# Patient Record
Sex: FEMALE | Race: WHITE
[De-identification: names, ages, dates, MRNs, and addresses within clinical notes are randomized per-mention and may not be internally consistent; named-entity substitution may affect disease eponyms.]

---

## 2018-03-07 ENCOUNTER — HOSPITAL ENCOUNTER (OUTPATIENT)
Age: 73
End: 2018-03-07
Payer: MEDICARE

## 2018-03-07 DIAGNOSIS — F11.20: Primary | ICD-10-CM

## 2018-03-07 LAB
AMPHET UR-MCNC: NEGATIVE NG/ML
BARBITURATE URINE VISTA: NEGATIVE
BENZODIAZEPINE URINE VISTA: NEGATIVE
COCAINE URINE VISTA: NEGATIVE
DRUG CONFIRMATION TO FOLLOW?: (no result)
ECSTACY URINE VISTA: NEGATIVE
METHADONE URINE VISTA: NEGATIVE
PCP UR QL: NEGATIVE
PH UR: 6 [PH]
THC URINE VISTA: NEGATIVE

## 2018-03-07 PROCEDURE — 80307 DRUG TEST PRSMV CHEM ANLYZR: CPT

## 2018-06-05 ENCOUNTER — HOSPITAL ENCOUNTER (OUTPATIENT)
Age: 73
End: 2018-06-05
Payer: MEDICARE

## 2018-06-05 DIAGNOSIS — M16.12: Primary | ICD-10-CM

## 2018-06-05 PROCEDURE — 73502 X-RAY EXAM HIP UNI 2-3 VIEWS: CPT

## 2018-06-18 ENCOUNTER — HOSPITAL ENCOUNTER (OUTPATIENT)
Age: 73
End: 2018-06-18
Payer: MEDICARE

## 2018-06-18 DIAGNOSIS — M48.07: Primary | ICD-10-CM

## 2018-06-18 DIAGNOSIS — M41.86: ICD-10-CM

## 2018-06-18 PROCEDURE — 72148 MRI LUMBAR SPINE W/O DYE: CPT

## 2018-08-29 ENCOUNTER — HOSPITAL ENCOUNTER (OUTPATIENT)
Age: 73
End: 2018-08-29
Payer: MEDICARE

## 2018-08-29 DIAGNOSIS — M48.061: ICD-10-CM

## 2018-08-29 DIAGNOSIS — M51.36: ICD-10-CM

## 2018-08-29 DIAGNOSIS — M47.896: Primary | ICD-10-CM

## 2018-08-29 PROCEDURE — 72100 X-RAY EXAM L-S SPINE 2/3 VWS: CPT

## 2018-09-01 ENCOUNTER — HOSPITAL ENCOUNTER (EMERGENCY)
Dept: HOSPITAL 100 - ED | Age: 73
Discharge: HOME | End: 2018-09-01
Payer: MEDICARE

## 2018-09-01 VITALS
TEMPERATURE: 98 F | RESPIRATION RATE: 18 BRPM | DIASTOLIC BLOOD PRESSURE: 91 MMHG | HEART RATE: 69 BPM | SYSTOLIC BLOOD PRESSURE: 173 MMHG | BODY MASS INDEX: 27.1 KG/M2 | OXYGEN SATURATION: 99 %

## 2018-09-01 DIAGNOSIS — Z79.899: ICD-10-CM

## 2018-09-01 DIAGNOSIS — Z79.891: ICD-10-CM

## 2018-09-01 DIAGNOSIS — J44.9: ICD-10-CM

## 2018-09-01 DIAGNOSIS — Z72.0: ICD-10-CM

## 2018-09-01 DIAGNOSIS — I10: ICD-10-CM

## 2018-09-01 DIAGNOSIS — M54.41: Primary | ICD-10-CM

## 2018-09-01 DIAGNOSIS — G89.29: ICD-10-CM

## 2018-09-01 PROCEDURE — 96372 THER/PROPH/DIAG INJ SC/IM: CPT

## 2018-09-01 PROCEDURE — 99282 EMERGENCY DEPT VISIT SF MDM: CPT

## 2018-09-02 ENCOUNTER — HOSPITAL ENCOUNTER (EMERGENCY)
Age: 73
Discharge: HOME | End: 2018-09-02
Payer: MEDICARE

## 2018-09-02 VITALS
RESPIRATION RATE: 18 BRPM | SYSTOLIC BLOOD PRESSURE: 127 MMHG | OXYGEN SATURATION: 97 % | HEART RATE: 62 BPM | DIASTOLIC BLOOD PRESSURE: 89 MMHG

## 2018-09-02 VITALS
OXYGEN SATURATION: 97 % | SYSTOLIC BLOOD PRESSURE: 156 MMHG | RESPIRATION RATE: 20 BRPM | DIASTOLIC BLOOD PRESSURE: 79 MMHG | TEMPERATURE: 98.6 F | HEART RATE: 76 BPM

## 2018-09-02 VITALS — BODY MASS INDEX: 27.1 KG/M2

## 2018-09-02 DIAGNOSIS — G89.29: ICD-10-CM

## 2018-09-02 DIAGNOSIS — Z79.899: ICD-10-CM

## 2018-09-02 DIAGNOSIS — J44.9: ICD-10-CM

## 2018-09-02 DIAGNOSIS — M54.5: Primary | ICD-10-CM

## 2018-09-02 PROCEDURE — 96372 THER/PROPH/DIAG INJ SC/IM: CPT

## 2018-09-02 PROCEDURE — 99282 EMERGENCY DEPT VISIT SF MDM: CPT

## 2018-09-14 VITALS
RESPIRATION RATE: 18 BRPM | SYSTOLIC BLOOD PRESSURE: 143 MMHG | HEART RATE: 73 BPM | DIASTOLIC BLOOD PRESSURE: 62 MMHG | TEMPERATURE: 96.9 F

## 2018-09-14 VITALS
TEMPERATURE: 98.3 F | RESPIRATION RATE: 17 BRPM | OXYGEN SATURATION: 98 % | SYSTOLIC BLOOD PRESSURE: 107 MMHG | HEART RATE: 74 BPM | DIASTOLIC BLOOD PRESSURE: 67 MMHG

## 2018-09-14 LAB
ALANINE AMINOTRANSFER ALT/SGPT: 21 U/L (ref 13–56)
ALBUMIN SERPL-MCNC: 3.4 G/DL (ref 3.2–5)
ALKALINE PHOSPHATASE: 119 U/L (ref 45–117)
ANION GAP: 9 (ref 5–15)
APTT PPP: 22.8 SECONDS (ref 24.1–36.2)
AST(SGOT): 11 U/L (ref 15–37)
BILIRUB DIRECT SERPL-MCNC: 0.1 MG/DL (ref 0–0.3)
BUN SERPL-MCNC: 15 MG/DL (ref 7–18)
BUN/CREAT RATIO: 23.1 RATIO (ref 10–20)
CALCIUM SERPL-MCNC: 8.6 MG/DL (ref 8.5–10.1)
CARBON DIOXIDE: 25 MMOL/L (ref 21–32)
CHLORIDE: 108 MMOL/L (ref 98–107)
DEPRECATED RDW RBC: 46.9 FL (ref 35.1–43.9)
DIFFERENTIAL INDICATED: (no result)
ERYTHROCYTE [DISTWIDTH] IN BLOOD: 14.2 % (ref 11.6–14.6)
EST GLOM FILT RATE - AFR AMER: 116 ML/MIN (ref 60–?)
ESTIMATED CREATININE CLEARANCE: 43.3 ML/MIN
GLOBULIN: 3.3 G/DL (ref 2.2–4.2)
GLUCOSE: 113 MG/DL (ref 74–106)
HCT VFR BLD AUTO: 40.3 % (ref 37–47)
HEMOGLOBIN: 13.1 G/DL (ref 12–15)
HGB BLD-MCNC: 13.1 G/DL (ref 12–15)
IMMATURE GRANULOCYTES COUNT: 0.01 X10^3/UL (ref 0–0)
MCV RBC: 90.4 FL (ref 81–99)
MEAN CORP HGB CONC: 32.5 G/GL (ref 32–36)
MEAN PLATELET VOL.: 10.1 FL (ref 6.2–12)
PLATELET # BLD: 264 K/MM3 (ref 150–450)
PLATELET COUNT: 264 K/MM3 (ref 150–450)
POSITIVE COUNT: NO
POSITIVE DIFFERENTIAL: NO
POSITIVE MORPHOLOGY: YES
POTASSIUM: 3.5 MMOL/L (ref 3.5–5.1)
PROTHROMBIN TIME (PROTIME)PT.: 12.9 SECONDS (ref 11.7–14.9)
RBC # BLD AUTO: 4.46 M/MM3 (ref 4.2–5.4)
RBC DISTRIBUTION WIDTH CV: 14.2 % (ref 11.6–14.6)
RBC DISTRIBUTION WIDTH SD: 46.9 FL (ref 35.1–43.9)
SCAN SMEAR PER REVIEW CRITERIA: (no result)
WBC # BLD AUTO: 10.9 K/MM3 (ref 4.4–11)
WHITE BLOOD COUNT: 10.9 K/MM3 (ref 4.4–11)

## 2018-09-21 ENCOUNTER — HOSPITAL ENCOUNTER (OUTPATIENT)
Dept: HOSPITAL 100 - WC | Age: 73
LOS: 9 days | End: 2018-09-30
Payer: MEDICARE

## 2018-09-21 VITALS
DIASTOLIC BLOOD PRESSURE: 89 MMHG | TEMPERATURE: 98.2 F | HEART RATE: 81 BPM | SYSTOLIC BLOOD PRESSURE: 147 MMHG | RESPIRATION RATE: 18 BRPM

## 2018-09-21 VITALS — BODY MASS INDEX: 27.1 KG/M2

## 2018-09-21 DIAGNOSIS — F17.200: ICD-10-CM
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
## 2018-09-21 DIAGNOSIS — Z79.899: ICD-10-CM

## 2018-09-21 DIAGNOSIS — S80.811A: Primary | ICD-10-CM

## 2018-09-21 PROCEDURE — 11042 DBRDMT SUBQ TIS 1ST 20SQCM/<: CPT

## 2018-09-21 PROCEDURE — 87205 SMEAR GRAM STAIN: CPT

## 2018-09-21 PROCEDURE — G0463 HOSPITAL OUTPT CLINIC VISIT: HCPCS

## 2018-09-21 PROCEDURE — 87070 CULTURE OTHR SPECIMN AEROBIC: CPT

## 2018-09-21 PROCEDURE — 87075 CULTR BACTERIA EXCEPT BLOOD: CPT

## 2018-09-21 PROCEDURE — 99203 OFFICE O/P NEW LOW 30 MIN: CPT

## 2018-09-26 ENCOUNTER — HOSPITAL ENCOUNTER (INPATIENT)
Dept: HOSPITAL 100 - ACINP | Age: 73
LOS: 1 days | Discharge: HOME HEALTH | DRG: 470 | End: 2018-09-27
Payer: MEDICARE

## 2018-09-26 VITALS
DIASTOLIC BLOOD PRESSURE: 77 MMHG | SYSTOLIC BLOOD PRESSURE: 156 MMHG | RESPIRATION RATE: 16 BRPM | HEART RATE: 70 BPM | OXYGEN SATURATION: 93 %

## 2018-09-26 VITALS
OXYGEN SATURATION: 98 % | RESPIRATION RATE: 16 BRPM | TEMPERATURE: 97.7 F | DIASTOLIC BLOOD PRESSURE: 72 MMHG | SYSTOLIC BLOOD PRESSURE: 159 MMHG | HEART RATE: 67 BPM

## 2018-09-26 VITALS — SYSTOLIC BLOOD PRESSURE: 137 MMHG | DIASTOLIC BLOOD PRESSURE: 79 MMHG

## 2018-09-26 VITALS — DIASTOLIC BLOOD PRESSURE: 91 MMHG | SYSTOLIC BLOOD PRESSURE: 201 MMHG

## 2018-09-26 VITALS
SYSTOLIC BLOOD PRESSURE: 137 MMHG | OXYGEN SATURATION: 96 % | HEART RATE: 69 BPM | RESPIRATION RATE: 16 BRPM | DIASTOLIC BLOOD PRESSURE: 75 MMHG

## 2018-09-26 VITALS
OXYGEN SATURATION: 96 % | RESPIRATION RATE: 16 BRPM | TEMPERATURE: 97.5 F | SYSTOLIC BLOOD PRESSURE: 137 MMHG | DIASTOLIC BLOOD PRESSURE: 75 MMHG | HEART RATE: 82 BPM

## 2018-09-26 VITALS
OXYGEN SATURATION: 94 % | RESPIRATION RATE: 14 BRPM | HEART RATE: 70 BPM | SYSTOLIC BLOOD PRESSURE: 156 MMHG | TEMPERATURE: 97.9 F | DIASTOLIC BLOOD PRESSURE: 82 MMHG

## 2018-09-26 VITALS
HEART RATE: 66 BPM | SYSTOLIC BLOOD PRESSURE: 150 MMHG | RESPIRATION RATE: 18 BRPM | OXYGEN SATURATION: 94 % | TEMPERATURE: 97.7 F | DIASTOLIC BLOOD PRESSURE: 69 MMHG

## 2018-09-26 VITALS
OXYGEN SATURATION: 95 % | DIASTOLIC BLOOD PRESSURE: 56 MMHG | HEART RATE: 72 BPM | SYSTOLIC BLOOD PRESSURE: 137 MMHG | TEMPERATURE: 98.42 F | RESPIRATION RATE: 18 BRPM

## 2018-09-26 VITALS
RESPIRATION RATE: 16 BRPM | SYSTOLIC BLOOD PRESSURE: 137 MMHG | DIASTOLIC BLOOD PRESSURE: 87 MMHG | HEART RATE: 77 BPM | OXYGEN SATURATION: 99 %

## 2018-09-26 VITALS
SYSTOLIC BLOOD PRESSURE: 198 MMHG | RESPIRATION RATE: 16 BRPM | HEART RATE: 81 BPM | OXYGEN SATURATION: 99 % | DIASTOLIC BLOOD PRESSURE: 95 MMHG

## 2018-09-26 VITALS
OXYGEN SATURATION: 94 % | RESPIRATION RATE: 14 BRPM | SYSTOLIC BLOOD PRESSURE: 162 MMHG | HEART RATE: 70 BPM | DIASTOLIC BLOOD PRESSURE: 75 MMHG

## 2018-09-26 VITALS
OXYGEN SATURATION: 96 % | SYSTOLIC BLOOD PRESSURE: 189 MMHG | RESPIRATION RATE: 16 BRPM | DIASTOLIC BLOOD PRESSURE: 75 MMHG | HEART RATE: 68 BPM

## 2018-09-26 VITALS — RESPIRATION RATE: 18 BRPM | HEART RATE: 70 BPM

## 2018-09-26 VITALS
OXYGEN SATURATION: 98 % | TEMPERATURE: 97.52 F | RESPIRATION RATE: 18 BRPM | DIASTOLIC BLOOD PRESSURE: 46 MMHG | HEART RATE: 70 BPM | SYSTOLIC BLOOD PRESSURE: 127 MMHG

## 2018-09-26 VITALS
OXYGEN SATURATION: 98 % | SYSTOLIC BLOOD PRESSURE: 137 MMHG | RESPIRATION RATE: 16 BRPM | TEMPERATURE: 97.1 F | HEART RATE: 67 BPM | DIASTOLIC BLOOD PRESSURE: 75 MMHG

## 2018-09-26 VITALS — DIASTOLIC BLOOD PRESSURE: 85 MMHG | SYSTOLIC BLOOD PRESSURE: 137 MMHG

## 2018-09-26 VITALS — SYSTOLIC BLOOD PRESSURE: 177 MMHG | DIASTOLIC BLOOD PRESSURE: 75 MMHG

## 2018-09-26 VITALS — SYSTOLIC BLOOD PRESSURE: 137 MMHG | DIASTOLIC BLOOD PRESSURE: 99 MMHG

## 2018-09-26 VITALS — BODY MASS INDEX: 27.3 KG/M2

## 2018-09-26 DIAGNOSIS — I10: ICD-10-CM

## 2018-09-26 DIAGNOSIS — M54.5: ICD-10-CM

## 2018-09-26 DIAGNOSIS — M32.9: ICD-10-CM

## 2018-09-26 DIAGNOSIS — G89.29: ICD-10-CM

## 2018-09-26 DIAGNOSIS — J44.9: ICD-10-CM

## 2018-09-26 DIAGNOSIS — Z87.442: ICD-10-CM

## 2018-09-26 DIAGNOSIS — M16.12: Primary | ICD-10-CM

## 2018-09-26 DIAGNOSIS — F17.210: ICD-10-CM

## 2018-09-26 DIAGNOSIS — Z96.641: ICD-10-CM

## 2018-09-26 PROCEDURE — 85730 THROMBOPLASTIN TIME PARTIAL: CPT

## 2018-09-26 PROCEDURE — 97162 PT EVAL MOD COMPLEX 30 MIN: CPT

## 2018-09-26 PROCEDURE — 80076 HEPATIC FUNCTION PANEL: CPT

## 2018-09-26 PROCEDURE — 97166 OT EVAL MOD COMPLEX 45 MIN: CPT

## 2018-09-26 PROCEDURE — 97110 THERAPEUTIC EXERCISES: CPT

## 2018-09-26 PROCEDURE — 93005 ELECTROCARDIOGRAM TRACING: CPT

## 2018-09-26 PROCEDURE — 73501 X-RAY EXAM HIP UNI 1 VIEW: CPT

## 2018-09-26 PROCEDURE — 87081 CULTURE SCREEN ONLY: CPT

## 2018-09-26 PROCEDURE — 85027 COMPLETE CBC AUTOMATED: CPT

## 2018-09-26 PROCEDURE — 85610 PROTHROMBIN TIME: CPT

## 2018-09-26 PROCEDURE — 99251: CPT

## 2018-09-26 PROCEDURE — 97530 THERAPEUTIC ACTIVITIES: CPT

## 2018-09-26 PROCEDURE — C1776 JOINT DEVICE (IMPLANTABLE): HCPCS

## 2018-09-26 PROCEDURE — 73502 X-RAY EXAM HIP UNI 2-3 VIEWS: CPT

## 2018-09-26 PROCEDURE — 80048 BASIC METABOLIC PNL TOTAL CA: CPT

## 2018-09-26 PROCEDURE — 94640 AIRWAY INHALATION TREATMENT: CPT

## 2018-09-26 PROCEDURE — 36415 COLL VENOUS BLD VENIPUNCTURE: CPT

## 2018-09-26 PROCEDURE — 76000 FLUOROSCOPY <1 HR PHYS/QHP: CPT

## 2018-09-26 PROCEDURE — 85025 COMPLETE CBC W/AUTO DIFF WBC: CPT

## 2018-09-26 PROCEDURE — G0463 HOSPITAL OUTPT CLINIC VISIT: HCPCS

## 2018-09-26 PROCEDURE — 0SRB04A REPLACEMENT OF LEFT HIP JOINT WITH CERAMIC ON POLYETHYLENE SYNTHETIC SUBSTITUTE, UNCEMENTED, OPEN APPROACH: ICD-10-PCS | Performed by: ORTHOPAEDIC SURGERY

## 2018-09-26 PROCEDURE — 99406 BEHAV CHNG SMOKING 3-10 MIN: CPT

## 2018-09-26 RX ADMIN — DOCUSATE SODIUM 50 MG AND SENNOSIDES 8.6 MG 2 TABLET: 8.6; 5 TABLET, FILM COATED ORAL at 22:58

## 2018-09-26 RX ADMIN — PROMETHAZINE HYDROCHLORIDE 12.5 MG: 25 INJECTION INTRAMUSCULAR; INTRAVENOUS at 17:28

## 2018-09-26 RX ADMIN — OXYCODONE HYDROCHLORIDE 10 MG: 10 TABLET, FILM COATED, EXTENDED RELEASE ORAL at 07:17

## 2018-09-27 VITALS — HEART RATE: 75 BPM | OXYGEN SATURATION: 98 % | RESPIRATION RATE: 16 BRPM

## 2018-09-27 VITALS
DIASTOLIC BLOOD PRESSURE: 49 MMHG | OXYGEN SATURATION: 98 % | RESPIRATION RATE: 16 BRPM | SYSTOLIC BLOOD PRESSURE: 130 MMHG | TEMPERATURE: 98.06 F | HEART RATE: 72 BPM

## 2018-09-27 VITALS
OXYGEN SATURATION: 94 % | TEMPERATURE: 97.8 F | DIASTOLIC BLOOD PRESSURE: 51 MMHG | RESPIRATION RATE: 18 BRPM | HEART RATE: 71 BPM | SYSTOLIC BLOOD PRESSURE: 115 MMHG

## 2018-09-27 VITALS
HEART RATE: 65 BPM | SYSTOLIC BLOOD PRESSURE: 126 MMHG | DIASTOLIC BLOOD PRESSURE: 57 MMHG | RESPIRATION RATE: 18 BRPM | OXYGEN SATURATION: 94 % | TEMPERATURE: 97.52 F

## 2018-09-27 LAB
ANION GAP: 7 (ref 5–15)
BUN SERPL-MCNC: 13 MG/DL (ref 7–18)
BUN/CREAT RATIO: 19.5 RATIO (ref 10–20)
CALCIUM SERPL-MCNC: 8.4 MG/DL (ref 8.5–10.1)
CARBON DIOXIDE: 26 MMOL/L (ref 21–32)
CHLORIDE: 106 MMOL/L (ref 98–107)
DEPRECATED RDW RBC: 47 FL (ref 35.1–43.9)
ERYTHROCYTE [DISTWIDTH] IN BLOOD: 14.5 % (ref 11.6–14.6)
EST GLOM FILT RATE - AFR AMER: 112 ML/MIN (ref 60–?)
ESTIMATED CREATININE CLEARANCE: 43.3 ML/MIN
GLUCOSE: 123 MG/DL (ref 74–106)
HCT VFR BLD AUTO: 35 % (ref 37–47)
HEMOGLOBIN: 11.6 G/DL (ref 12–15)
HGB BLD-MCNC: 11.6 G/DL (ref 12–15)
MCV RBC: 91.1 FL (ref 81–99)
MEAN CORP HGB CONC: 33.1 G/GL (ref 32–36)
MEAN PLATELET VOL.: 10 FL (ref 6.2–12)
PLATELET # BLD: 188 K/MM3 (ref 150–450)
PLATELET COUNT: 188 K/MM3 (ref 150–450)
POTASSIUM: 4.1 MMOL/L (ref 3.5–5.1)
RBC # BLD AUTO: 3.84 M/MM3 (ref 4.2–5.4)
RBC DISTRIBUTION WIDTH CV: 14.5 % (ref 11.6–14.6)
RBC DISTRIBUTION WIDTH SD: 47 FL (ref 35.1–43.9)
SCAN INDICATED ON CBC? Y/N: NO
WBC # BLD AUTO: 12.5 K/MM3 (ref 4.4–11)
WHITE BLOOD COUNT: 12.5 K/MM3 (ref 4.4–11)

## 2018-09-27 RX ADMIN — DOCUSATE SODIUM 50 MG AND SENNOSIDES 8.6 MG 2 TABLET: 8.6; 5 TABLET, FILM COATED ORAL at 07:44

## 2018-09-27 RX ADMIN — BUDESONIDE 0.5 MG: 0.5 SUSPENSION RESPIRATORY (INHALATION) at 08:00

## 2019-09-05 ENCOUNTER — HOSPITAL ENCOUNTER (OUTPATIENT)
Age: 74
End: 2019-09-05
Payer: MEDICARE

## 2019-09-05 VITALS — BODY MASS INDEX: 27.3 KG/M2

## 2019-09-05 DIAGNOSIS — M79.606: ICD-10-CM

## 2019-09-05 DIAGNOSIS — M54.9: Primary | ICD-10-CM

## 2019-09-05 PROCEDURE — 72100 X-RAY EXAM L-S SPINE 2/3 VWS: CPT

## 2019-12-04 ENCOUNTER — HOSPITAL ENCOUNTER (OUTPATIENT)
Age: 74
End: 2019-12-04
Payer: MEDICARE

## 2019-12-04 VITALS — BODY MASS INDEX: 27.3 KG/M2

## 2019-12-04 DIAGNOSIS — M54.5: Primary | ICD-10-CM

## 2019-12-04 DIAGNOSIS — M79.606: ICD-10-CM

## 2019-12-04 PROCEDURE — 72110 X-RAY EXAM L-2 SPINE 4/>VWS: CPT

## 2020-06-17 ENCOUNTER — HOSPITAL ENCOUNTER (OUTPATIENT)
Dept: HOSPITAL 100 - MRI | Age: 75
End: 2020-06-17
Payer: MEDICARE

## 2020-06-17 DIAGNOSIS — M79.606: ICD-10-CM

## 2020-06-17 DIAGNOSIS — M54.9: Primary | ICD-10-CM

## 2020-06-17 PROCEDURE — 72148 MRI LUMBAR SPINE W/O DYE: CPT

## 2020-10-21 ENCOUNTER — HOSPITAL ENCOUNTER (OUTPATIENT)
Age: 75
End: 2020-10-21
Payer: MEDICARE

## 2020-10-21 VITALS — BODY MASS INDEX: 26.4 KG/M2

## 2020-10-21 DIAGNOSIS — M35.00: ICD-10-CM

## 2020-10-21 DIAGNOSIS — I10: ICD-10-CM

## 2020-10-21 DIAGNOSIS — J44.9: ICD-10-CM

## 2020-10-21 DIAGNOSIS — K21.9: ICD-10-CM

## 2020-10-21 DIAGNOSIS — M79.7: ICD-10-CM

## 2020-10-21 DIAGNOSIS — M06.4: Primary | ICD-10-CM

## 2020-10-21 DIAGNOSIS — R76.8: ICD-10-CM

## 2020-10-21 LAB
ALANINE AMINOTRANSFER ALT/SGPT: 21 U/L (ref 13–56)
ALBUMIN SERPL-MCNC: 3.8 G/DL (ref 3.2–5)
ALKALINE PHOSPHATASE: 119 U/L (ref 45–117)
ANION GAP: 6 (ref 5–15)
APTT PPP: 23.7 SECONDS (ref 24.1–36.2)
AST(SGOT): 11 U/L (ref 15–37)
BUN SERPL-MCNC: 12 MG/DL (ref 7–18)
BUN/CREAT RATIO: 22.6 RATIO (ref 10–20)
CALCIUM SERPL-MCNC: 9 MG/DL (ref 8.5–10.1)
CARBON DIOXIDE: 28 MMOL/L (ref 21–32)
CHLORIDE: 106 MMOL/L (ref 98–107)
CREAT UR-MCNC: 119 MG/DL
CRP SERPL-MCNC: < 2.9 MG/L (ref 0–3)
DEPRECATED RDW RBC: 49.6 FL (ref 35.1–43.9)
DIFFERENTIAL INDICATED: (no result)
ERYTHROCYTE [DISTWIDTH] IN BLOOD: 14.7 % (ref 11.6–14.6)
EST GLOM FILT RATE - AFR AMER: 145 ML/MIN (ref 60–?)
EXAGEN: (no result)
GLOBULIN: 3.4 G/DL (ref 2.2–4.2)
GLUCOSE: 87 MG/DL (ref 74–106)
HCT VFR BLD AUTO: 40 % (ref 37–47)
HEMOGLOBIN: 12.4 G/DL (ref 12–15)
HGB BLD-MCNC: 12.4 G/DL (ref 12–15)
IMMATURE GRANULOCYTES COUNT: 0.02 X10^3/UL (ref 0–0)
LEUKOCYTE ESTERASE UR QL STRIP: 25 /UL
MCV RBC: 90.9 FL (ref 81–99)
MEAN CORP HGB CONC: 31 G/DL (ref 32–36)
MEAN PLATELET VOL.: 10.6 FL (ref 6.2–12)
NRBC FLAGGED BY ANALYZER: 0 % (ref 0–5)
PLATELET # BLD: 234 K/MM3 (ref 150–450)
PLATELET COUNT: 234 K/MM3 (ref 150–450)
POSITIVE MORPHOLOGY: YES
POTASSIUM: 3.9 MMOL/L (ref 3.5–5.1)
PROT UR QL STRIP.AUTO: 30 MG/DL
PROT UR-MCNC: 18.7 MG/DL (ref ?–11.9)
PROT/CREAT UR: 157 MG/G CRE (ref 0–200)
PROTHROMBIN TIME (PROTIME)PT.: 12.5 SECONDS (ref 11.7–14.9)
RBC # BLD AUTO: 4.4 M/MM3 (ref 4.2–5.4)
RBC DISTRIBUTION WIDTH CV: 14.7 % (ref 11.6–14.6)
RBC DISTRIBUTION WIDTH SD: 49.6 FL (ref 35.1–43.9)
RBC UR QL: 25 /UL
REACTIVE LYMPHOCYTE: (no result)
SCAN SMEAR PER REVIEW CRITERIA: (no result)
SP GR UR: 1.01 (ref 1–1.03)
URINE PRESERVATIVE: (no result)
WBC # BLD AUTO: 9.4 K/MM3 (ref 4.4–11)
WHITE BLOOD COUNT: 9.4 K/MM3 (ref 4.4–11)

## 2020-10-21 PROCEDURE — 81002 URINALYSIS NONAUTO W/O SCOPE: CPT

## 2020-10-21 PROCEDURE — 82570 ASSAY OF URINE CREATININE: CPT

## 2020-10-21 PROCEDURE — 85025 COMPLETE CBC W/AUTO DIFF WBC: CPT

## 2020-10-21 PROCEDURE — 85598 HEXAGNAL PHOSPH PLTLT NEUTRL: CPT

## 2020-10-21 PROCEDURE — 36415 COLL VENOUS BLD VENIPUNCTURE: CPT

## 2020-10-21 PROCEDURE — 85652 RBC SED RATE AUTOMATED: CPT

## 2020-10-21 PROCEDURE — 85730 THROMBOPLASTIN TIME PARTIAL: CPT

## 2020-10-21 PROCEDURE — 85610 PROTHROMBIN TIME: CPT

## 2020-10-21 PROCEDURE — 86706 HEP B SURFACE ANTIBODY: CPT

## 2020-10-21 PROCEDURE — 84156 ASSAY OF PROTEIN URINE: CPT

## 2020-10-21 PROCEDURE — 86803 HEPATITIS C AB TEST: CPT

## 2020-10-21 PROCEDURE — 86140 C-REACTIVE PROTEIN: CPT

## 2020-10-21 PROCEDURE — 85670 THROMBIN TIME PLASMA: CPT

## 2020-10-21 PROCEDURE — 87340 HEPATITIS B SURFACE AG IA: CPT

## 2020-10-21 PROCEDURE — 80053 COMPREHEN METABOLIC PANEL: CPT

## 2020-10-29 LAB
DILUTE RUSSELL VIPER VENOM: 35 SEC (ref 0–47)
PTT-LA: 33 SEC (ref 0–51.9)
SCREEN DRVVT: 35 SEC (ref 0–47)

## 2021-01-25 ENCOUNTER — HOSPITAL ENCOUNTER (OUTPATIENT)
Age: 76
End: 2021-01-25
Payer: MEDICARE

## 2021-01-25 VITALS — BODY MASS INDEX: 26.4 KG/M2

## 2021-01-25 DIAGNOSIS — R10.84: Primary | ICD-10-CM

## 2021-01-25 LAB
AMYLASE SERPL-CCNC: 62 U/L (ref 25–115)
LIPASE: 117 U/L (ref 73–393)

## 2021-01-25 PROCEDURE — 83690 ASSAY OF LIPASE: CPT

## 2021-01-25 PROCEDURE — 82150 ASSAY OF AMYLASE: CPT

## 2022-11-09 ENCOUNTER — HOSPITAL ENCOUNTER (OUTPATIENT)
Age: 77
Discharge: HOME | End: 2022-11-09
Payer: MEDICARE

## 2022-11-09 DIAGNOSIS — R19.7: ICD-10-CM

## 2022-11-09 DIAGNOSIS — R10.13: Primary | ICD-10-CM

## 2022-11-09 LAB
ALANINE AMINOTRANSFER ALT/SGPT: 19 U/L (ref 13–56)
ALBUMIN SERPL-MCNC: 3.5 G/DL (ref 3.2–5)
ALKALINE PHOSPHATASE: 91 U/L (ref 45–117)
AMYLASE SERPL-CCNC: 61 U/L (ref 25–115)
ANION GAP: 6 (ref 5–15)
AST(SGOT): 14 U/L (ref 15–37)
BUN SERPL-MCNC: 15 MG/DL (ref 7–18)
BUN/CREAT RATIO: 24.9 RATIO (ref 10–20)
CALCIUM SERPL-MCNC: 9.3 MG/DL (ref 8.5–10.1)
CARBON DIOXIDE: 26 MMOL/L (ref 21–32)
CHLORIDE: 106 MMOL/L (ref 98–107)
CRP SERPL-MCNC: 3.18 MG/L (ref 0–3)
DEPRECATED RDW RBC: 46.3 FL (ref 35.1–43.9)
DIFFERENTIAL INDICATED: (no result)
ERYTHROCYTE [DISTWIDTH] IN BLOOD: 14.4 % (ref 11.6–14.6)
EST GLOM FILT RATE - AFR AMER: 125 ML/MIN (ref 60–?)
GLOBULIN: 3.4 G/DL (ref 2.2–4.2)
GLUCOSE: 86 MG/DL (ref 74–106)
HCT VFR BLD AUTO: 40.2 % (ref 37–47)
HEMOGLOBIN: 13.3 G/DL (ref 12–15)
HGB BLD-MCNC: 13.3 G/DL (ref 12–15)
IMMATURE GRANULOCYTES COUNT: 0.02 X10^3/UL (ref 0–0)
LIPASE: 98 U/L (ref 73–393)
MCV RBC: 88.4 FL (ref 81–99)
MEAN CORP HGB CONC: 33.1 G/DL (ref 32–36)
MEAN PLATELET VOL.: 10.4 FL (ref 6.2–12)
NRBC FLAGGED BY ANALYZER: 0 % (ref 0–5)
PLATELET # BLD: 179 K/MM3 (ref 150–450)
PLATELET COUNT: 179 K/MM3 (ref 150–450)
POSITIVE MORPHOLOGY: YES
POTASSIUM: 4.2 MMOL/L (ref 3.5–5.1)
RBC # BLD AUTO: 4.55 M/MM3 (ref 4.2–5.4)
RBC DISTRIBUTION WIDTH CV: 14.4 % (ref 11.6–14.6)
RBC DISTRIBUTION WIDTH SD: 46.3 FL (ref 35.1–43.9)
REACTIVE LYMPHOCYTE: (no result)
SCAN SMEAR PER REVIEW CRITERIA: (no result)
WBC # BLD AUTO: 7.9 K/MM3 (ref 4.4–11)
WHITE BLOOD COUNT: 7.9 K/MM3 (ref 4.4–11)

## 2022-11-09 PROCEDURE — 82785 ASSAY OF IGE: CPT

## 2022-11-09 PROCEDURE — 83690 ASSAY OF LIPASE: CPT

## 2022-11-09 PROCEDURE — 85025 COMPLETE CBC W/AUTO DIFF WBC: CPT

## 2022-11-09 PROCEDURE — 82787 IGG 1 2 3 OR 4 EACH: CPT

## 2022-11-09 PROCEDURE — 82150 ASSAY OF AMYLASE: CPT

## 2022-11-09 PROCEDURE — 83516 IMMUNOASSAY NONANTIBODY: CPT

## 2022-11-09 PROCEDURE — 86140 C-REACTIVE PROTEIN: CPT

## 2022-11-09 PROCEDURE — 86256 FLUORESCENT ANTIBODY TITER: CPT

## 2022-11-09 PROCEDURE — 86255 FLUORESCENT ANTIBODY SCREEN: CPT

## 2022-11-09 PROCEDURE — 86235 NUCLEAR ANTIGEN ANTIBODY: CPT

## 2022-11-09 PROCEDURE — 80053 COMPREHEN METABOLIC PANEL: CPT

## 2022-11-09 PROCEDURE — 36415 COLL VENOUS BLD VENIPUNCTURE: CPT

## 2022-11-09 PROCEDURE — 86225 DNA ANTIBODY NATIVE: CPT

## 2022-11-09 PROCEDURE — 82784 ASSAY IGA/IGD/IGG/IGM EACH: CPT

## 2022-11-09 PROCEDURE — 85652 RBC SED RATE AUTOMATED: CPT

## 2022-11-10 LAB
ANTI-CHROMATIN: <0.2 AI (ref 0–0.9)
ANTI-JO: <0.2 AI (ref 0–0.9)
ENA JO1 AB SER-ACNC: <0.2 AI (ref 0–0.9)
SJOGREN'S ANTI-SS-A TEST: 1.6 AI (ref 0–0.9)
SJOGREN'S ANTI-SS-B TEST: < 0.2 AI (ref 0–0.9)

## 2022-11-11 LAB
ANTI-DSDNA  AB: <1 IU/ML (ref 0–9)
DSDNA AB SER-ACNC: <1 IU/ML (ref 0–9)
IGA SER-ACNC: 197 MG/DL (ref 64–422)
IMMUNOGLOBULIN A: 197 MG/DL (ref 64–422)

## 2022-11-14 ENCOUNTER — HOSPITAL ENCOUNTER (OUTPATIENT)
Dept: HOSPITAL 100 - LAB | Age: 77
Discharge: HOME | End: 2022-11-14
Payer: MEDICARE

## 2022-11-14 DIAGNOSIS — R19.7: ICD-10-CM

## 2022-11-14 DIAGNOSIS — R10.13: Primary | ICD-10-CM

## 2022-11-14 PROCEDURE — 82705 FATS/LIPIDS FECES QUAL: CPT

## 2022-11-14 PROCEDURE — 82653 EL-1 FECAL QUANTITATIVE: CPT

## 2022-11-17 LAB
ACTIN IGG SERPL-ACNC: 8 UNITS (ref 0–19)
ANTI-SMOOTH MUSCLE ABS: 8 UNITS (ref 0–19)
C-ANCA SER-ACNC: (no result) TITER
IGA SERPL-MCNC: 192 MG/DL (ref 64–422)
IGG SERPL-MCNC: 834 MG/DL (ref 586–1602)
IGG1 SER-MCNC: 628 MG/DL (ref 248–810)
IGG2 SER-MCNC: 95 MG/DL (ref 130–555)
IGG3 SER-MCNC: 36 MG/DL (ref 15–102)
IGG4 SER-MCNC: 24 MG/DL (ref 2–96)
IGM SERPL-MCNC: 77 MG/DL (ref 26–217)
IMMUNOGLOBULIN A: 192 MG/DL (ref 64–422)
IMMUNOGLOBULIN M: 77 MG/DL (ref 26–217)
P-ANCA TITR SER IF: (no result) TITER

## 2023-01-11 ENCOUNTER — HOSPITAL ENCOUNTER (OUTPATIENT)
Age: 78
Discharge: HOME | End: 2023-01-11
Payer: MEDICARE

## 2023-01-11 DIAGNOSIS — R19.7: ICD-10-CM

## 2023-01-11 DIAGNOSIS — R10.13: Primary | ICD-10-CM

## 2023-01-11 LAB — LDH: 181 U/L (ref 84–246)

## 2023-01-11 PROCEDURE — 86334 IMMUNOFIX E-PHORESIS SERUM: CPT

## 2023-01-11 PROCEDURE — 84165 PROTEIN E-PHORESIS SERUM: CPT

## 2023-01-11 PROCEDURE — 82784 ASSAY IGA/IGD/IGG/IGM EACH: CPT

## 2023-01-11 PROCEDURE — 36415 COLL VENOUS BLD VENIPUNCTURE: CPT

## 2023-01-11 PROCEDURE — 83615 LACTATE (LD) (LDH) ENZYME: CPT

## 2023-01-11 PROCEDURE — 82941 ASSAY OF GASTRIN: CPT

## 2023-01-13 LAB
ALBUMIN SERPL-SCNC: 3.8 G/DL (ref 2.9–4.4)
ALBUMIN: 3.8 G/DL (ref 2.9–4.4)
GAMMA GLOB SERPL ELPH-MCNC: 0.8 G/DL (ref 0.4–1.8)
GAMMA GLOBULIN: 0.8 G/DL (ref 0.4–1.8)
GASTRIN SERPL-MCNC: 71 PG/ML (ref 0–115)
IGA SERPL-MCNC: 158 MG/DL (ref 64–422)
IGG SERPL-MCNC: 857 MG/DL (ref 586–1602)
IGM SERPL-MCNC: 70 MG/DL (ref 26–217)
IMMUNOGLOBULIN A: 158 MG/DL (ref 64–422)
IMMUNOGLOBULIN G: 857 MG/DL (ref 586–1602)
IMMUNOGLOBULIN M: 70 MG/DL (ref 26–217)
PROEL- TOTAL PROTEIN: 6.4 G/DL (ref 6–8.5)
PROT SERPL-MCNC: 6.4 G/DL (ref 6–8.5)

## 2023-01-18 ENCOUNTER — HOSPITAL ENCOUNTER (OUTPATIENT)
Dept: HOSPITAL 100 - LABSPEC | Age: 78
Discharge: HOME | End: 2023-01-18
Payer: MEDICARE

## 2023-01-18 DIAGNOSIS — K58.9: Primary | ICD-10-CM

## 2023-01-18 DIAGNOSIS — R10.13: ICD-10-CM

## 2023-01-18 DIAGNOSIS — R19.7: ICD-10-CM

## 2023-01-18 PROCEDURE — 83993 ASSAY FOR CALPROTECTIN FECAL: CPT

## 2023-01-18 PROCEDURE — 87506 IADNA-DNA/RNA PROBE TQ 6-11: CPT

## 2023-01-18 PROCEDURE — 83630 LACTOFERRIN FECAL (QUAL): CPT

## 2023-01-20 LAB — CALPROTECTIN, STOOL: 114 UG/G (ref 0–120)

## 2023-02-21 ENCOUNTER — HOSPITAL ENCOUNTER (OUTPATIENT)
Dept: HOSPITAL 100 - MRI | Age: 78
Discharge: HOME | End: 2023-02-21
Payer: MEDICARE

## 2023-02-21 DIAGNOSIS — K85.90: Primary | ICD-10-CM

## 2023-02-21 PROCEDURE — 74181 MRI ABDOMEN W/O CONTRAST: CPT

## 2023-03-07 ENCOUNTER — HOSPITAL ENCOUNTER (OUTPATIENT)
Dept: HOSPITAL 100 - LAB | Age: 78
Discharge: HOME | End: 2023-03-07
Payer: MEDICARE

## 2023-03-07 DIAGNOSIS — D13.4: Primary | ICD-10-CM

## 2023-03-07 PROCEDURE — 36415 COLL VENOUS BLD VENIPUNCTURE: CPT

## 2023-03-07 PROCEDURE — 86301 IMMUNOASSAY TUMOR CA 19-9: CPT

## 2023-03-09 LAB — CARBOHYDRATE AG 19-9  2261: 4 U/ML (ref 0–35)

## 2023-03-15 ENCOUNTER — HOSPITAL ENCOUNTER (OUTPATIENT)
Dept: HOSPITAL 100 - EN | Age: 78
Discharge: HOME | End: 2023-03-15
Payer: MEDICARE

## 2023-03-15 VITALS
SYSTOLIC BLOOD PRESSURE: 143 MMHG | DIASTOLIC BLOOD PRESSURE: 54 MMHG | HEART RATE: 65 BPM | RESPIRATION RATE: 16 BRPM | OXYGEN SATURATION: 100 %

## 2023-03-15 VITALS
RESPIRATION RATE: 16 BRPM | OXYGEN SATURATION: 100 % | DIASTOLIC BLOOD PRESSURE: 54 MMHG | HEART RATE: 64 BPM | SYSTOLIC BLOOD PRESSURE: 147 MMHG

## 2023-03-15 VITALS
SYSTOLIC BLOOD PRESSURE: 141 MMHG | OXYGEN SATURATION: 100 % | DIASTOLIC BLOOD PRESSURE: 54 MMHG | TEMPERATURE: 98.7 F | HEART RATE: 66 BPM | RESPIRATION RATE: 16 BRPM

## 2023-03-15 VITALS
OXYGEN SATURATION: 98 % | SYSTOLIC BLOOD PRESSURE: 147 MMHG | TEMPERATURE: 98.2 F | DIASTOLIC BLOOD PRESSURE: 54 MMHG | HEART RATE: 70 BPM | RESPIRATION RATE: 18 BRPM

## 2023-03-15 VITALS
DIASTOLIC BLOOD PRESSURE: 56 MMHG | OXYGEN SATURATION: 98 % | HEART RATE: 64 BPM | SYSTOLIC BLOOD PRESSURE: 141 MMHG | RESPIRATION RATE: 16 BRPM

## 2023-03-15 VITALS
SYSTOLIC BLOOD PRESSURE: 137 MMHG | RESPIRATION RATE: 14 BRPM | TEMPERATURE: 97.2 F | DIASTOLIC BLOOD PRESSURE: 64 MMHG | HEART RATE: 72 BPM | OXYGEN SATURATION: 98 %

## 2023-03-15 VITALS — BODY MASS INDEX: 26.4 KG/M2

## 2023-03-15 VITALS — DIASTOLIC BLOOD PRESSURE: 54 MMHG | SYSTOLIC BLOOD PRESSURE: 147 MMHG

## 2023-03-15 DIAGNOSIS — K25.7: ICD-10-CM

## 2023-03-15 DIAGNOSIS — Z79.899: ICD-10-CM

## 2023-03-15 DIAGNOSIS — K29.70: ICD-10-CM

## 2023-03-15 DIAGNOSIS — G47.30: ICD-10-CM

## 2023-03-15 DIAGNOSIS — D12.5: ICD-10-CM

## 2023-03-15 DIAGNOSIS — K58.0: ICD-10-CM

## 2023-03-15 DIAGNOSIS — K21.00: ICD-10-CM

## 2023-03-15 DIAGNOSIS — K57.30: ICD-10-CM

## 2023-03-15 DIAGNOSIS — K63.89: ICD-10-CM

## 2023-03-15 DIAGNOSIS — F17.200: ICD-10-CM

## 2023-03-15 DIAGNOSIS — I10: ICD-10-CM

## 2023-03-15 DIAGNOSIS — Z79.82: ICD-10-CM

## 2023-03-15 DIAGNOSIS — D12.3: Primary | ICD-10-CM

## 2023-03-15 PROCEDURE — 45380 COLONOSCOPY AND BIOPSY: CPT

## 2023-03-15 PROCEDURE — 88305 TISSUE EXAM BY PATHOLOGIST: CPT

## 2023-03-15 PROCEDURE — 88342 IMHCHEM/IMCYTCHM 1ST ANTB: CPT

## 2023-03-15 PROCEDURE — 45385 COLONOSCOPY W/LESION REMOVAL: CPT

## 2023-03-15 PROCEDURE — 0DJD8ZZ INSPECTION OF LOWER INTESTINAL TRACT, VIA NATURAL OR ARTIFICIAL OPENING ENDOSCOPIC: ICD-10-PCS | Performed by: INTERNAL MEDICINE

## 2023-03-15 PROCEDURE — 43239 EGD BIOPSY SINGLE/MULTIPLE: CPT

## 2023-03-15 PROCEDURE — 88313 SPECIAL STAINS GROUP 2: CPT

## 2023-06-07 ENCOUNTER — HOSPITAL ENCOUNTER (EMERGENCY)
Dept: HOSPITAL 100 - ED | Age: 78
Discharge: LEFT BEFORE DISCHARGE | End: 2023-06-07
Payer: MEDICARE

## 2023-06-07 VITALS
RESPIRATION RATE: 15 BRPM | DIASTOLIC BLOOD PRESSURE: 75 MMHG | SYSTOLIC BLOOD PRESSURE: 206 MMHG | OXYGEN SATURATION: 98 % | HEART RATE: 81 BPM | TEMPERATURE: 97.52 F

## 2023-06-07 VITALS — BODY MASS INDEX: 28.7 KG/M2

## 2023-06-07 DIAGNOSIS — Z53.21: ICD-10-CM

## 2023-06-07 DIAGNOSIS — R51.9: Primary | ICD-10-CM

## 2023-06-08 ENCOUNTER — HOSPITAL ENCOUNTER (EMERGENCY)
Age: 78
Discharge: HOME | End: 2023-06-08
Payer: MEDICARE

## 2023-06-08 VITALS
RESPIRATION RATE: 14 BRPM | TEMPERATURE: 97.7 F | DIASTOLIC BLOOD PRESSURE: 77 MMHG | OXYGEN SATURATION: 100 % | HEART RATE: 73 BPM | SYSTOLIC BLOOD PRESSURE: 186 MMHG

## 2023-06-08 VITALS — DIASTOLIC BLOOD PRESSURE: 64 MMHG | SYSTOLIC BLOOD PRESSURE: 182 MMHG

## 2023-06-08 VITALS — RESPIRATION RATE: 16 BRPM

## 2023-06-08 VITALS — BODY MASS INDEX: 28.7 KG/M2

## 2023-06-08 DIAGNOSIS — H11.31: ICD-10-CM

## 2023-06-08 DIAGNOSIS — F17.210: ICD-10-CM

## 2023-06-08 DIAGNOSIS — J01.20: Primary | ICD-10-CM

## 2023-06-08 DIAGNOSIS — J30.9: ICD-10-CM

## 2023-06-08 DIAGNOSIS — G47.33: ICD-10-CM

## 2023-06-08 DIAGNOSIS — M06.9: ICD-10-CM

## 2023-06-08 LAB
ANION GAP: 2 (ref 5–15)
BUN SERPL-MCNC: 23 MG/DL (ref 7–18)
BUN/CREAT RATIO: 34.3 RATIO (ref 10–20)
CALCIUM SERPL-MCNC: 8.5 MG/DL (ref 8.5–10.1)
CARBON DIOXIDE: 23 MMOL/L (ref 21–32)
CHLORIDE: 113 MMOL/L (ref 98–107)
CRP SERPL-MCNC: < 2.9 MG/L (ref 0–3)
DEPRECATED RDW RBC: 49.8 FL (ref 35.1–43.9)
DIFFERENTIAL COMMENT: (no result)
DIFFERENTIAL INDICATED: (no result)
ERYTHROCYTE [DISTWIDTH] IN BLOOD: 14.8 % (ref 11.6–14.6)
EST GLOM FILT RATE - AFR AMER: 110 ML/MIN (ref 60–?)
ESTIMATED CREATININE CLEARANCE: 38.44 ML/MIN
FIBRINOGEN PPP COAG.DERIVED-MCNC: 383 MG/DL (ref 203–444)
FIBRINOGEN: 383 MG/DL (ref 203–444)
GLUCOSE: 97 MG/DL (ref 74–106)
HCT VFR BLD AUTO: 40.8 % (ref 37–47)
HEMOGLOBIN: 12.8 G/DL (ref 12–15)
HGB BLD-MCNC: 12.8 G/DL (ref 12–15)
IMMATURE GRANULOCYTES COUNT: 0.04 X10^3/UL (ref 0–0)
MANUAL DIF COMMENT BLD-IMP: (no result)
MCV RBC: 91.5 FL (ref 81–99)
MEAN CORP HGB CONC: 31.4 G/DL (ref 32–36)
MEAN PLATELET VOL.: 10.1 FL (ref 6.2–12)
NRBC FLAGGED BY ANALYZER: 0 % (ref 0–5)
PLATELET # BLD: 191 K/MM3 (ref 150–450)
PLATELET COUNT: 191 K/MM3 (ref 150–450)
POSITIVE DIFFERENTIAL: YES
POSITIVE MORPHOLOGY: YES
POTASSIUM: 4.5 MMOL/L (ref 3.5–5.1)
RBC # BLD AUTO: 4.46 M/MM3 (ref 4.2–5.4)
RBC DISTRIBUTION WIDTH CV: 14.8 % (ref 11.6–14.6)
RBC DISTRIBUTION WIDTH SD: 49.8 FL (ref 35.1–43.9)
SCAN SMEAR PER REVIEW CRITERIA: (no result)
WBC # BLD AUTO: 10.2 K/MM3 (ref 4.4–11)
WHITE BLOOD COUNT: 10.2 K/MM3 (ref 4.4–11)

## 2023-06-08 PROCEDURE — 85652 RBC SED RATE AUTOMATED: CPT

## 2023-06-08 PROCEDURE — 85384 FIBRINOGEN ACTIVITY: CPT

## 2023-06-08 PROCEDURE — 85025 COMPLETE CBC W/AUTO DIFF WBC: CPT

## 2023-06-08 PROCEDURE — 70450 CT HEAD/BRAIN W/O DYE: CPT

## 2023-06-08 PROCEDURE — 80048 BASIC METABOLIC PNL TOTAL CA: CPT

## 2023-06-08 PROCEDURE — 86140 C-REACTIVE PROTEIN: CPT

## 2023-06-08 PROCEDURE — A4216 STERILE WATER/SALINE, 10 ML: HCPCS

## 2023-06-08 PROCEDURE — 99284 EMERGENCY DEPT VISIT MOD MDM: CPT

## 2024-02-07 ENCOUNTER — EVALUATION (OUTPATIENT)
Dept: OCCUPATIONAL THERAPY | Facility: CLINIC | Age: 79
End: 2024-02-07
Payer: MEDICARE

## 2024-02-07 DIAGNOSIS — G56.01 CARPAL TUNNEL SYNDROME, RIGHT: ICD-10-CM

## 2024-02-07 DIAGNOSIS — M19.041 PRIMARY OSTEOARTHRITIS, RIGHT HAND: ICD-10-CM

## 2024-02-07 DIAGNOSIS — M79.641 HAND PAIN, RIGHT: Primary | ICD-10-CM

## 2024-02-07 PROCEDURE — 97166 OT EVAL MOD COMPLEX 45 MIN: CPT | Mod: GO

## 2024-02-07 PROCEDURE — 97110 THERAPEUTIC EXERCISES: CPT | Mod: GO

## 2024-02-07 ASSESSMENT — ENCOUNTER SYMPTOMS
OCCASIONAL FEELINGS OF UNSTEADINESS: 1
DEPRESSION: 0
LOSS OF SENSATION IN FEET: 1

## 2024-02-07 ASSESSMENT — PAIN SCALES - GENERAL: PAINLEVEL_OUTOF10: 4

## 2024-02-07 ASSESSMENT — PAIN - FUNCTIONAL ASSESSMENT: PAIN_FUNCTIONAL_ASSESSMENT: 0-10

## 2024-02-07 NOTE — PROGRESS NOTES
Occupational Therapy Evaluation    Patient Name: Anamika Rosales  MRN: [id number]  Today's Date: 2/7/2024       Subjective   Current Problem:  Pt arrives with thermoplastic orthotic in place.  Pain:  Pain Assessment  Pain Assessment: 0-10  Pain Score: 4  Pain Type: Surgical pain  Pain Location: Hand  Pain Orientation: Right  Home Living: [place] and has many animals that she cares for.     Prior Level of Function: Independent       Objective     Special Tests Inspected incisions.  Clean and well healed thumb and palm has eschar.     Precautions:  Precautions  STEADI Fall Risk Score (The score of 4 or more indicates an increased risk of falling): 6  Hearing/Visual Limitations: [place]  UE Weight Bearing Status: Right Non-Weight Bearing  Medical Precautions: Fall precautions, Other (comment) (COPD, Lupus)  Post-Surgical Precautions: Other (comment) (CMC Arthroplasty)  Splinting: Pt has fabricated thumb spica  General Assessments:  Hand Function  Gross Grasp: Impaired (deferred)  Coordination: Impaired (deferred)  Extremity Assessments:  RUE   RUE : Exceptions to WFL  RUE AROM (degrees)  R Wrist Flexion 0-80: 30 Degrees  R Wrist Extension 0-70: 30 Degrees  LUE   LUE: Within Functional Limits  ROM R thumb MP 0/15 IP 0/40, radial abduction 35    Outcomes:  To be completed next session    TREATMENT  Therapeutic Exercise  Therapeutic Exercise Performed: Yes  Therapeutic Exercise Activity 1: Instructed pt on thumb MP and IP ROM with flexion and extension.  Therapeutic Exercise Activity 2: Also added thumb opposition to IF and MF only.   Access Code: [email or web address]  URL: [email or web address]  Date: 02/07/2024  Prepared by: Anish Beth    Exercises  - Thumb Abduction AROM on Table  - 3 x daily - 7 x weekly - 1 sets - 10 reps - 3 hold  - Thumb AROM Opposition  - 3 x daily - 7 x weekly - 1 sets - 10 reps - 3 hold  - Seated Thumb IP Flexion AROM  - 3 x daily - 7 x weekly - 1 sets - 10 reps - 3 hold  -  Seated Edema Reduction Massage  - 3 x daily - 7 x weekly - 1 sets - 10 reps  Manual Therapy  Manual Therapy Performed: Yes  Manual Therapy Activity 1: Initiated retrograde massage on dorsal side of hand and wrist.  Manual Therapy Activity 2: Initiated scar massage on thumb scar.  Pt to leave palm scar alone at this time.     Assessment/Plan Pt is a 78 year old female who had a R CMC Arthroplasty and CTR.  Pt      OP Plan  OT Plan: TE, TA, Manual  Duration: 6 weeks    Active       OT Goals       Pt will increase R thumb ROM to WFL and wrist to WFL       Start:  02/07/24    Expected End:  04/03/24            Pt will return to light activity with little pain 1/10.       Start:  02/07/24    Expected End:  04/03/24            Pt will be independent with HEP carryover in order to regain ROM and function.       Start:  02/07/24    Expected End:  04/03/24

## 2024-02-12 ENCOUNTER — APPOINTMENT (OUTPATIENT)
Dept: OCCUPATIONAL THERAPY | Facility: CLINIC | Age: 79
End: 2024-02-12
Payer: MEDICARE

## 2024-02-21 ENCOUNTER — TREATMENT (OUTPATIENT)
Dept: OCCUPATIONAL THERAPY | Facility: CLINIC | Age: 79
End: 2024-02-21
Payer: MEDICARE

## 2024-02-21 DIAGNOSIS — G56.01 CARPAL TUNNEL SYNDROME, RIGHT: ICD-10-CM

## 2024-02-21 DIAGNOSIS — M79.641 HAND PAIN, RIGHT: ICD-10-CM

## 2024-02-21 DIAGNOSIS — M19.041 PRIMARY OSTEOARTHRITIS, RIGHT HAND: ICD-10-CM

## 2024-02-21 PROCEDURE — 97530 THERAPEUTIC ACTIVITIES: CPT | Mod: GO

## 2024-02-21 PROCEDURE — 97022 WHIRLPOOL THERAPY: CPT | Mod: GO

## 2024-02-21 PROCEDURE — 97110 THERAPEUTIC EXERCISES: CPT | Mod: GO

## 2024-02-21 ASSESSMENT — PAIN SCALES - GENERAL: PAINLEVEL_OUTOF10: 5 - MODERATE PAIN

## 2024-02-21 ASSESSMENT — PAIN - FUNCTIONAL ASSESSMENT: PAIN_FUNCTIONAL_ASSESSMENT: 0-10

## 2024-02-21 ASSESSMENT — PAIN DESCRIPTION - DESCRIPTORS: DESCRIPTORS: ACHING

## 2024-02-21 NOTE — PROGRESS NOTES
Occupational Therapy Treatment    Patient Name: Anamika Rosales  MRN: [id number]  Today's Date: 2/21/2024  Time Calculation  Start Time: 0830  Stop Time: 0925  Time Calculation (min): 55 min    Subjective   Current Problem:  Pt states she is achy.  Also her L hand is achy as well.    Pain:  Pain Assessment  Pain Assessment: 0-10  Pain Score: 5 - Moderate pain  Pain Type: Surgical pain  Pain Location: Hand  Pain Orientation: Right  Pain Descriptors: Aching    Objective   General Visit Information: Pt is likely overusing her L hand due to precautions of surgery on R hand.     Splinting and Casting: Pt continues in thermoplastic thumb spica.     Treatment:  Therapeutic Exercise  Therapeutic Exercise Performed: Yes  Therapeutic Exercise Activity 1: Upgraded HEP with tendon glides for digits 2-5.  Pt completed 5 reps with verbal and physical cues.  Therapeutic Exercise Activity 2: Cone stacking to gain thumb radial abduction x 10 reps.  Therapeutic Exercise Activity 3: Used balls to also gain radial abduction x 10 reps.  Therapeutic Exercise Activity 4: Also upgraded HEP with gentle wrist ROM ext/flex and RD/UD. Pt completed 10 reps.    Access Code: [email or web address]  URL: [email or web address]  Date: 02/21/2024  Prepared by: Anish Beth    Exercises  - Hand AROM Tendon Gliding Series  - 3 x daily - 7 x weekly - 1 sets - 10 reps  - Seated Digit Tendon Gliding  - 3 x daily - 7 x weekly - 1 sets - 10 reps  - Wrist Tendon Gliding  - 3 x daily - 7 x weekly - 1 sets - 10 reps  - Seated Wrist Flexor Full Fist Tendon Gliding  - 3 x daily - 7 x weekly - 1 sets - 10 reps  - Wrist AROM Flexion Extension  - 3 x daily - 7 x weekly - 1 sets - 10 reps  - Wrist AROM Flexion Extension  - 3 x daily - 7 x weekly - 1 sets - 10 reps  - Seated Wrist Radial and Ulnar Deviation AROM  - 3 x daily - 7 x weekly - 1 sets - 10 reps  - Wrist AROM Radial Ulnar Deviation  - 3 x daily - 7 x weekly - 1 sets - 10 reps    Therapeutic  Activity  Therapeutic Activity Performed: Yes  Therapeutic Activity 1: Educated pt on adaptive utility cutting board and rocker knife.  Therapeutic Activity 2: Education pt on alternative method of wringing out wash cloth using water spout.    Manual Therapy  Manual Therapy Performed: Yes  Manual Therapy Activity 1: Scar massage performed on pt's incision and surrounding tissue.  Manual Therapy Activity 2: Retrograde massage on volar and dorsal surface.    Other Activity  Other Activity Performed: Yes  Other Activity 1: Fluidotherapy with AROM and for desensitization.     Assessment/Plan Pt reports that the massage feels good as well as movement. Pt good compliance.     OP Plan  OT Plan: Measure next session.    GOALS:  Active       OT Goals       Pt will increase R thumb ROM to WFL and wrist to WFL       Start:  02/07/24    Expected End:  04/03/24            Pt will return to light activity with little pain 1/10.       Start:  02/07/24    Expected End:  04/03/24            Pt will be independent with HEP carryover in order to regain ROM and function.       Start:  02/07/24    Expected End:  04/03/24

## 2024-02-26 ENCOUNTER — APPOINTMENT (OUTPATIENT)
Dept: OCCUPATIONAL THERAPY | Facility: CLINIC | Age: 79
End: 2024-02-26
Payer: MEDICARE

## 2024-02-28 ENCOUNTER — APPOINTMENT (OUTPATIENT)
Dept: OCCUPATIONAL THERAPY | Facility: CLINIC | Age: 79
End: 2024-02-28
Payer: MEDICARE

## 2024-03-04 ENCOUNTER — APPOINTMENT (OUTPATIENT)
Dept: OCCUPATIONAL THERAPY | Facility: CLINIC | Age: 79
End: 2024-03-04
Payer: MEDICARE

## 2024-03-06 ENCOUNTER — TREATMENT (OUTPATIENT)
Dept: OCCUPATIONAL THERAPY | Facility: CLINIC | Age: 79
End: 2024-03-06
Payer: MEDICARE

## 2024-03-06 DIAGNOSIS — M19.041 PRIMARY OSTEOARTHRITIS, RIGHT HAND: ICD-10-CM

## 2024-03-06 DIAGNOSIS — G56.01 CARPAL TUNNEL SYNDROME, RIGHT: ICD-10-CM

## 2024-03-06 DIAGNOSIS — M79.641 HAND PAIN, RIGHT: ICD-10-CM

## 2024-03-06 PROCEDURE — 97110 THERAPEUTIC EXERCISES: CPT | Mod: GO

## 2024-03-06 PROCEDURE — 97530 THERAPEUTIC ACTIVITIES: CPT | Mod: GO

## 2024-03-06 PROCEDURE — 97168 OT RE-EVAL EST PLAN CARE: CPT | Mod: GO

## 2024-03-06 ASSESSMENT — PAIN - FUNCTIONAL ASSESSMENT: PAIN_FUNCTIONAL_ASSESSMENT: 0-10

## 2024-03-06 ASSESSMENT — PAIN SCALES - GENERAL: PAINLEVEL_OUTOF10: 5 - MODERATE PAIN

## 2024-03-06 NOTE — PROGRESS NOTES
Occupational Therapy    Occupational Therapy Re Evaluation    Name: Anamika Rosales  MRN: [id number]  : 1945  Date: 24  Time Calculation  Start Time: 1400  Stop Time: 1445  Time Calculation (min): 45 min    Assessment: Pt is s/p CMC Arthroplasty R thumb approx 8 1/2 weeks post op.  Pt continues to struggle with strength and pain.  Pt has shown improvement in ROM and pt reports she does use her hands but her L more and that one is starting to hurt.       Plan:  Outpatient Plan  OT Plan: Continue to progress with strengthening.  Plan of Care Agreement: Patient    Subjective   Current Problem:  1. Carpal tunnel syndrome, right  Follow Up In Occupational Therapy      2. Primary osteoarthritis, right hand  Follow Up In Occupational Therapy      3. Hand pain, right  Follow Up In Occupational Therapy        Precautions:  Precautions  Precautions Comment: CMC Arthroplasty  Vital Signs:     Pain Assessment:  Pain Assessment  Pain Assessment: 0-10  Pain Score: 5 - Moderate pain  Pain Type: Surgical pain  Pain Location: Hand  Pain Orientation: Right    Objective   Cognition:      Hand Function: Pt is struggling with normal hand function due to surgery and continued weakness.   Gross Grasp: Impaired ( R 5# L 20#)  Extremities: RUE AROM (degrees)  R Forearm Pronation  0-80-90: 80 Degrees  R Forearm Supination  0-80-90: 80 Degrees  R Wrist Flexion 0-80: 50 Degrees  R Wrist Extension 0-70: 30 Degrees  R Wrist Radial Deviation 0-20: 10 Degrees  R Wrist Ulnar Deviation 0-30: 30 Degrees    Outcome Measures:  Quick Dash 47.73    OP EDUCATION:Advised pt that she may be getting into overuse of L UE due to her surgical procedure on R UE.  Splinting  Location: R hand  Type: Comfy cool  Splinting Education: Fitting, [place], [place], Wear schedule, Precautions     Therapeutic Exercise  Therapeutic Exercise Performed: Yes  Therapeutic Exercise Activity 1: Upgraded HEP with  and pinch of light pink sponge.  TE included  full and flat , lateral and 3 jaw as well as tip pinch. To be completed 2 times daily 10 reps of each.    Other Activity  Other Activity Performed: Yes  Other Activity 1: Fluidotherapy with AROM and for desensitization.     Goals:  Active       OT Goals       Pt will increase R thumb ROM to WFL and wrist to WFL (Progressing)       Start:  02/07/24    Expected End:  04/03/24            Pt will return to light activity with little pain 1/10. (Not Progressing)       Start:  02/07/24    Expected End:  04/03/24            Pt will be independent with HEP carryover in order to regain ROM and function. (Progressing)       Start:  02/07/24    Expected End:  04/03/24               OT Goals       OT Goal 4       Start:  03/06/24    Expected End:  04/03/24       Pt will increase overall hand strength by 15# R UE in order to do basic ADLS and IADLS.

## 2024-03-11 ENCOUNTER — APPOINTMENT (OUTPATIENT)
Dept: OCCUPATIONAL THERAPY | Facility: CLINIC | Age: 79
End: 2024-03-11
Payer: MEDICARE

## 2024-03-13 ENCOUNTER — TREATMENT (OUTPATIENT)
Dept: OCCUPATIONAL THERAPY | Facility: CLINIC | Age: 79
End: 2024-03-13
Payer: MEDICARE

## 2024-03-13 DIAGNOSIS — M79.641 HAND PAIN, RIGHT: ICD-10-CM

## 2024-03-13 DIAGNOSIS — M19.041 PRIMARY OSTEOARTHRITIS, RIGHT HAND: ICD-10-CM

## 2024-03-13 DIAGNOSIS — G56.01 CARPAL TUNNEL SYNDROME, RIGHT: ICD-10-CM

## 2024-03-13 PROCEDURE — 97530 THERAPEUTIC ACTIVITIES: CPT | Mod: GO

## 2024-03-13 PROCEDURE — 97110 THERAPEUTIC EXERCISES: CPT | Mod: GO

## 2024-03-13 ASSESSMENT — PAIN SCALES - GENERAL: PAINLEVEL_OUTOF10: 4

## 2024-03-13 ASSESSMENT — PAIN - FUNCTIONAL ASSESSMENT: PAIN_FUNCTIONAL_ASSESSMENT: 0-10

## 2024-03-13 NOTE — PROGRESS NOTES
Occupational Therapy Treatment    Patient Name: Anamika Rosales  MRN: [id number]  Today's Date: 3/13/2024  Time Calculation  Start Time: 1405  Stop Time: 1445  Time Calculation (min): 40 min    Subjective   Current Problem:  Pt states been a bad week.  Pt had a death in the family.   Pain:  Pain Assessment  Pain Assessment: 0-10  Pain Score: 4  Pain Type: Surgical pain  Pain Location: Hand  Pain Orientation: Right    Objective      Treatment:  Therapeutic Exercise  Therapeutic Exercise Performed: Yes  Therapeutic Exercise Activity 1: Upgraded HEP with theraputty exercises including full and flat , 3 jaw, lateral and tip pinch as well as finger extension with loop.  Issued pink putty.    Access Code: [email or web address]  URL: [email or web address]  Date: 03/13/2024  Prepared by: Anish Beth    Exercises  - Rolling Putty on Table  - 2 x daily - 7 x weekly - 3 sets - 5 reps  - Finger Extension with Putty  - 2 x daily - 7 x weekly - 3 sets - 5 reps  Therapeutic Activity  Therapeutic Activity Performed: Yes  Therapeutic Activity 1: Reviewed jt protection strategy for vacuuming which pt states she will try.  Therapeutic Activity 2: Provided adaptive equipment ideas for zipping including button hook with zip pull and fiscar sizzors for cutting.     Assessment/Plan Pt reports she is getting stronger, and her numbness is improving.  Pt has made good improvements with ability to perform TE. Good HEP compliance.     OP Plan  OT Plan: Continue to progress    GOALS:  Active       OT Goals       Pt will increase R thumb ROM to WFL and wrist to WFL (Progressing)       Start:  02/07/24    Expected End:  04/03/24            Pt will return to light activity with little pain 1/10. (Not Progressing)       Start:  02/07/24    Expected End:  04/03/24            Pt will be independent with HEP carryover in order to regain ROM and function. (Progressing)       Start:  02/07/24    Expected End:  04/03/24               OT Goals        OT Goal 4       Start:  03/06/24    Expected End:  04/03/24       Pt will increase overall hand strength by 15# R UE in order to do basic ADLS and IADLS.

## 2024-03-18 ENCOUNTER — APPOINTMENT (OUTPATIENT)
Dept: OCCUPATIONAL THERAPY | Facility: CLINIC | Age: 79
End: 2024-03-18
Payer: MEDICARE

## 2024-03-18 ENCOUNTER — DOCUMENTATION (OUTPATIENT)
Dept: OCCUPATIONAL THERAPY | Facility: CLINIC | Age: 79
End: 2024-03-18
Payer: MEDICARE

## 2024-03-18 NOTE — PROGRESS NOTES
Occupational Therapy                 Therapy Communication Note    Patient Name: Anamika Rosales  MRN: [id number]  Today's Date: 3/18/2024     Discipline: Occupational Therapy    Missed Visit Reason:      Missed Time: Cancel    Comment:Out sick

## 2024-03-20 ENCOUNTER — APPOINTMENT (OUTPATIENT)
Dept: OCCUPATIONAL THERAPY | Facility: CLINIC | Age: 79
End: 2024-03-20
Payer: MEDICARE

## 2024-03-25 ENCOUNTER — APPOINTMENT (OUTPATIENT)
Dept: OCCUPATIONAL THERAPY | Facility: CLINIC | Age: 79
End: 2024-03-25
Payer: MEDICARE

## 2024-03-27 ENCOUNTER — APPOINTMENT (OUTPATIENT)
Dept: OCCUPATIONAL THERAPY | Facility: CLINIC | Age: 79
End: 2024-03-27
Payer: MEDICARE

## 2024-09-09 ENCOUNTER — DOCUMENTATION (OUTPATIENT)
Dept: OCCUPATIONAL THERAPY | Facility: CLINIC | Age: 79
End: 2024-09-09
Payer: MEDICARE

## 2024-09-09 NOTE — PROGRESS NOTES
Occupational Therapy    Discharge Summary    Name: Anamika Rosales  MRN: [id number]  : 1945  Date: 24    Discharge Summary: OT    Discharge Information: Date of discharge 24, Date of last visit 3/13/24, Date of evaluation 24, Number of attended visits 4, Referred by Dr. Chicas, and Referred for CMC Arthroplasty    Therapy Summary: Pt was progressing well in therapy.  However, pt had a death in her family and has not returned.    Discharge Status: Discharged     Rehab Discharge Reason: Failed to schedule and/or keep follow-up appointment(s)

## 2025-06-16 ENCOUNTER — HOSPITAL ENCOUNTER (OUTPATIENT)
Age: 80
Discharge: HOME | End: 2025-06-16
Payer: MEDICARE

## 2025-06-16 DIAGNOSIS — K55.1: Primary | ICD-10-CM

## 2025-06-16 DIAGNOSIS — R10.13: ICD-10-CM

## 2025-06-16 DIAGNOSIS — K86.81: ICD-10-CM

## 2025-06-16 PROCEDURE — 82653 EL-1 FECAL QUANTITATIVE: CPT

## 2025-06-16 PROCEDURE — 83993 ASSAY FOR CALPROTECTIN FECAL: CPT

## 2025-06-18 LAB
CALPROTECTIN, STOOL: 786 UG/G (ref 0–120)
ELASTASE PANC STL QL: > 800 (ref 200–?)

## 2025-07-18 ENCOUNTER — HOSPITAL ENCOUNTER (EMERGENCY)
Age: 80
Discharge: HOME | End: 2025-07-18
Payer: MEDICARE

## 2025-07-18 VITALS
SYSTOLIC BLOOD PRESSURE: 160 MMHG | DIASTOLIC BLOOD PRESSURE: 63 MMHG | OXYGEN SATURATION: 100 % | RESPIRATION RATE: 16 BRPM | HEART RATE: 79 BPM

## 2025-07-18 VITALS
DIASTOLIC BLOOD PRESSURE: 65 MMHG | TEMPERATURE: 98.42 F | HEART RATE: 72 BPM | OXYGEN SATURATION: 99 % | RESPIRATION RATE: 16 BRPM | SYSTOLIC BLOOD PRESSURE: 181 MMHG

## 2025-07-18 VITALS
DIASTOLIC BLOOD PRESSURE: 76 MMHG | HEART RATE: 67 BPM | OXYGEN SATURATION: 100 % | SYSTOLIC BLOOD PRESSURE: 184 MMHG | RESPIRATION RATE: 16 BRPM

## 2025-07-18 VITALS
DIASTOLIC BLOOD PRESSURE: 66 MMHG | OXYGEN SATURATION: 96 % | TEMPERATURE: 98 F | HEART RATE: 80 BPM | SYSTOLIC BLOOD PRESSURE: 191 MMHG | RESPIRATION RATE: 16 BRPM

## 2025-07-18 DIAGNOSIS — F17.210: ICD-10-CM

## 2025-07-18 DIAGNOSIS — I10: ICD-10-CM

## 2025-07-18 DIAGNOSIS — M32.9: ICD-10-CM

## 2025-07-18 DIAGNOSIS — M41.9: ICD-10-CM

## 2025-07-18 DIAGNOSIS — R16.1: ICD-10-CM

## 2025-07-18 DIAGNOSIS — Z79.899: ICD-10-CM

## 2025-07-18 DIAGNOSIS — N39.0: Primary | ICD-10-CM

## 2025-07-18 DIAGNOSIS — Z79.51: ICD-10-CM

## 2025-07-18 DIAGNOSIS — J44.9: ICD-10-CM

## 2025-07-18 DIAGNOSIS — G47.33: ICD-10-CM

## 2025-07-18 DIAGNOSIS — E78.00: ICD-10-CM

## 2025-07-18 DIAGNOSIS — Z79.82: ICD-10-CM

## 2025-07-18 DIAGNOSIS — M79.7: ICD-10-CM

## 2025-07-18 LAB
ABSOLUTE NEUTROPHIL COUNT: 3.7 X10^3/UL (ref 2–7.7)
ALBUMIN SERPL-MCNC: 4.1 G/DL (ref 3.4–4.8)
ALBUMIN/GLOB SERPL: 1.4 RATIO (ref 0.9–2.4)
ALP SERPL-CCNC: 114 U/L (ref 35–104)
ALT SERPL-CCNC: 12 U/L (ref ?–34)
AMORPH SED URNS QL MICRO: (no result)
ANION GAP SERPL CALC-SCNC: 10 MMOL/L (ref 5–15)
ANISOCYTOSIS BLD QL SMEAR: (no result)
AST(SGOT): 17 U/L (ref ?–31)
BACTERIA: (no result) /HPF
BASO STIPL BLD QL SMEAR: (no result)
BASOPHIL#: 0.04 X10^3/UL
BASOPHIL%: 0.5 % (ref 0–1)
BASOPHILS NFR SPEC MANUAL: (no result) % (ref 0–1)
BILIRUB UR QL STRIP: NEGATIVE MG/DL
BITE CELLS BLD QL SMEAR: (no result)
BLOOD UR QL: (no result) /HPF (ref 0–5)
BLOOD UR QL: 25 /UL
BUN SERPL-MCNC: 11 MG/DL (ref 4–19)
BUN/CREAT SERPL: 21.3 RATIO (ref 10–20)
BURR CELLS BLD QL SMEAR: (no result)
CALCIUM SERPL-MCNC: 9.7 MG/DL (ref 7.6–11)
CAOX CRY URNS QL MICRO: (no result) /HPF
CHLORIDE: 106 MMOL/L (ref 98–108)
CLARITY UR: CLEAR
CO2 BLDCV-SCNC: 25.2 MMOL/L (ref 21–32)
COARSE GRAN CASTS URNS QL MICRO: (no result) /LPF
COLOR UR: YELLOW
CREAT SERPL-MCNC: 0.54 MG/DL (ref 0.7–1.2)
DEPRECATED RDW RBC: 49.8 FL (ref 35.1–43.9)
DIFFERENTIAL INDICATED: (no result)
DOHLE BOD BLD QL SMEAR: (no result)
EOSINOPHIL # BLD: 0.22 X10^3/UL
ERYTHROCYTE [DISTWIDTH] IN BLOOD: 16.8 % (ref 11.6–14.6)
ESTIMATED CREATININE CLEARANCE: 51.22 ML/MIN (ref 50–250)
GLOBULIN: 2.9 G/DL (ref 2.2–4.2)
GLUCOSE SERPL-MCNC: 83 MG/DL (ref 70–99)
GLUCOSE, DIPSTICK: NORMAL MG/DL
HCT VFR BLD AUTO: 37.8 % (ref 37–47)
HGB BLD-MCNC: 12.1 G/DL (ref 12–15)
HOWELL-JOLLY BOD BLD QL SMEAR: (no result)
HYALINE CASTS URNS QL MICRO: (no result) /LPF (ref 0–5)
HYPOCHROMIA BLD QL: (no result)
IMM GRANULOCYTES NFR BLD AUTO: 0.4 % (ref 0–0.9)
IMMATURE GRANULOCYTES COUNT: 0.03 X10^3/UL (ref 0–0)
KETONE-DIPSTICK: NEGATIVE MG/DL
LEUKOCYTE ESTERASE UR QL STRIP: NEGATIVE /UL
LIPASE: 22 U/L (ref 13–75)
LYMPHOCYTES # SPEC AUTO: 4.25 X10^3/UL (ref 0.83–4.51)
LYMPHOCYTES # SPEC AUTO: 4.25 X10^3/UL (ref 0.83–4.51)
LYMPHOCYTES NFR SPEC AUTO: 50.1 % (ref 19–41)
Lab: 2.6 % (ref 0–5)
MACROCYTES BLD QL: (no result)
MANUAL DIF COMMENT BLD-IMP: (no result)
MCH RBC QN AUTO: 26.4 PG (ref 27–32)
MCV RBC: 82.5 FL (ref 81–99)
MEAN CORP HGB CONC: 32 G/DL (ref 32–36)
MEAN PLATELET VOL.: 11.4 FL (ref 6.2–12)
MONOCYTE#: 0.3 X10^3/UL
MONOCYTE%: 3.5 % (ref 0–10)
MUCOUS THREADS URNS QL MICRO: (no result) /HPF
NEUTROPHIL #: 3.65 X10^3/UL (ref 2.7–7.7)
NEUTROPHILS NFR BLD AUTO: 42.9 % (ref 47–70)
NEUTS HYPERSEG # BLD: (no result) 10*3/UL
NITRITE UR QL STRIP: POSITIVE
NRBC FLAGGED BY ANALYZER: 0 % (ref 0–5)
PH UR: 6 [PH] (ref 5–8)
PLATELET # BLD: 155 K/MM3 (ref 150–450)
POLYCHROMASIA BLD QL SMEAR: (no result)
POSITIVE MORPHOLOGY: YES
POTASSIUM SPEC-MCNC: 3.9 MMOL/L (ref 3.3–5.1)
PROT UR QL STRIP.AUTO: 30 MG/DL
PROTEIN, TOTAL: 7 G/DL (ref 5.9–8.4)
RBC # BLD AUTO: 4.58 M/MM3 (ref 4.2–5.4)
REACTIVE LYMPHOCYTE: (no result)
SCAN SMEAR PER REVIEW CRITERIA: (no result)
SODIUM LEVEL: 141 MMOL/L (ref 133–145)
SP GR UR: 1.01 (ref 1–1.03)
SQUAMOUS URNS QL MICRO: (no result) /HPF (ref 5–10)
TOTAL BILIRUBIN: 0.38 MG/DL (ref 0–1.3)
URINE PRESERVATIVE: (no result)
UROBILINOGEN UR QL STRIP.AUTO: NORMAL MG/DL
WBC # BLD AUTO: 8.5 K/MM3 (ref 4.4–11)
WBC #/AREA URNS HPF: (no result) /HPF (ref 0–5)
WBC NRBC COR # BLD: (no result) K/MM3 (ref 4.4–11)

## 2025-07-18 PROCEDURE — 81001 URINALYSIS AUTO W/SCOPE: CPT

## 2025-07-18 PROCEDURE — 99282 EMERGENCY DEPT VISIT SF MDM: CPT

## 2025-07-18 PROCEDURE — 80053 COMPREHEN METABOLIC PANEL: CPT

## 2025-07-18 PROCEDURE — 87186 SC STD MICRODIL/AGAR DIL: CPT

## 2025-07-18 PROCEDURE — 74174 CTA ABD&PLVS W/CONTRAST: CPT

## 2025-07-18 PROCEDURE — 83690 ASSAY OF LIPASE: CPT

## 2025-07-18 PROCEDURE — 85025 COMPLETE CBC W/AUTO DIFF WBC: CPT

## 2025-07-18 PROCEDURE — A4216 STERILE WATER/SALINE, 10 ML: HCPCS

## 2025-07-18 PROCEDURE — 87077 CULTURE AEROBIC IDENTIFY: CPT

## 2025-07-18 PROCEDURE — 87086 URINE CULTURE/COLONY COUNT: CPT

## 2025-07-18 PROCEDURE — 87088 URINE BACTERIA CULTURE: CPT
